# Patient Record
Sex: FEMALE | Race: WHITE | NOT HISPANIC OR LATINO | Employment: FULL TIME | URBAN - METROPOLITAN AREA
[De-identification: names, ages, dates, MRNs, and addresses within clinical notes are randomized per-mention and may not be internally consistent; named-entity substitution may affect disease eponyms.]

---

## 2023-05-16 ENCOUNTER — OFFICE VISIT (OUTPATIENT)
Dept: PRIMARY CARE | Facility: CLINIC | Age: 23
End: 2023-05-16
Payer: COMMERCIAL

## 2023-05-16 VITALS
SYSTOLIC BLOOD PRESSURE: 106 MMHG | DIASTOLIC BLOOD PRESSURE: 68 MMHG | TEMPERATURE: 98.2 F | HEIGHT: 59 IN | WEIGHT: 108 LBS | BODY MASS INDEX: 21.77 KG/M2

## 2023-05-16 DIAGNOSIS — J32.9 SINUSITIS, UNSPECIFIED CHRONICITY, UNSPECIFIED LOCATION: Primary | ICD-10-CM

## 2023-05-16 DIAGNOSIS — S03.00XA DISLOCATION OF TEMPOROMANDIBULAR JOINT, INITIAL ENCOUNTER: ICD-10-CM

## 2023-05-16 PROCEDURE — 1036F TOBACCO NON-USER: CPT | Performed by: NURSE PRACTITIONER

## 2023-05-16 PROCEDURE — 99214 OFFICE O/P EST MOD 30 MIN: CPT | Performed by: NURSE PRACTITIONER

## 2023-05-16 RX ORDER — SULFACETAMIDE SODIUM, SULFUR 100; 50 MG/G; MG/G
EMULSION TOPICAL
COMMUNITY
Start: 2023-05-11

## 2023-05-16 RX ORDER — KETOCONAZOLE 20 MG/G
CREAM TOPICAL
COMMUNITY
Start: 2023-05-15

## 2023-05-16 RX ORDER — AZITHROMYCIN 250 MG/1
TABLET, FILM COATED ORAL
Qty: 6 TABLET | Refills: 0 | Status: SHIPPED | OUTPATIENT
Start: 2023-05-16 | End: 2023-05-21

## 2023-05-16 RX ORDER — BENZOYL PEROXIDE 50 MG/ML
LIQUID TOPICAL
COMMUNITY
Start: 2023-01-31

## 2023-05-16 RX ORDER — SERTRALINE HYDROCHLORIDE 50 MG/1
75 TABLET, FILM COATED ORAL DAILY
COMMUNITY
Start: 2023-04-18 | End: 2023-07-25 | Stop reason: SDUPTHER

## 2023-05-16 RX ORDER — FLUTICASONE PROPIONATE 50 MCG
1 SPRAY, SUSPENSION (ML) NASAL
COMMUNITY
Start: 2015-10-01

## 2023-05-16 NOTE — PROGRESS NOTES
"Subjective   Patient ID: Melly Ortiz is a 22 y.o. female who presents for Sinus Problem (Sinus headache, lightheaded x 5 days).    HPI   Headache for the last 5 days.  Feels like pressure.  Thought she was dehydrated  No hx of allergies.  Cold symptoms last weeka nd then felt better now this.    Felt like she was 'falling'  had wakened from sleep.  Has been feeling 'off'  Works in ER      Review of Systems   All other systems reviewed and are negative.      Objective   /68 (BP Location: Left arm, Patient Position: Sitting)   Temp 36.8 °C (98.2 °F) (Temporal)   Ht 1.499 m (4' 11\")   Wt 49 kg (108 lb)   BMI 21.81 kg/m²     Physical Exam  Vitals and nursing note reviewed.   Constitutional:       Appearance: Normal appearance.   HENT:      Head: Normocephalic and atraumatic.      Right Ear: Ear canal and external ear normal.      Left Ear: Ear canal and external ear normal.      Ears:      Comments: Fluid/air line CHRISTIANE     Nose: Congestion present.      Mouth/Throat:      Comments: PND  Neck:      Thyroid: No thyroid mass, thyromegaly or thyroid tenderness.   Cardiovascular:      Rate and Rhythm: Normal rate and regular rhythm.      Heart sounds: Normal heart sounds.   Pulmonary:      Effort: Pulmonary effort is normal.      Breath sounds: Normal breath sounds.   Musculoskeletal:         General: Tenderness present.      Cervical back: Normal range of motion and neck supple.      Comments: + crepitus at TMJ CHRISTIANE   Lymphadenopathy:      Cervical: No cervical adenopathy.   Skin:     General: Skin is warm.   Neurological:      Mental Status: She is alert and oriented to person, place, and time.   Psychiatric:         Mood and Affect: Mood normal.         Behavior: Behavior normal.         Assessment/Plan   Problem List Items Addressed This Visit          Musculoskeletal    Dislocation of jaw       Infectious/Inflammatory    Sinusitis - Primary     Azithromycin 2 pills together today, then one each of the next " four days.    Flonase 2 sprays each nostril once daily.  Talk with Dentist about TMJ

## 2023-05-16 NOTE — PATIENT INSTRUCTIONS
Nice to meet you today.  Start Azithromycin 2 pills together today and then one each of the next four days.  TAKE AFTER EATING  Use Flonase 2 sprays each nostril once daily.  Talk with the Dentist about your TMJ - you can use Ibuprofen as needed for the discomfort.  Let me know if not improving.

## 2023-05-30 ENCOUNTER — OFFICE VISIT (OUTPATIENT)
Dept: PRIMARY CARE | Facility: CLINIC | Age: 23
End: 2023-05-30
Payer: COMMERCIAL

## 2023-05-30 VITALS
DIASTOLIC BLOOD PRESSURE: 62 MMHG | TEMPERATURE: 98.2 F | WEIGHT: 111 LBS | BODY MASS INDEX: 22.42 KG/M2 | SYSTOLIC BLOOD PRESSURE: 116 MMHG

## 2023-05-30 DIAGNOSIS — L03.031 CELLULITIS OF GREAT TOE OF RIGHT FOOT: Primary | ICD-10-CM

## 2023-05-30 PROCEDURE — 99213 OFFICE O/P EST LOW 20 MIN: CPT | Performed by: NURSE PRACTITIONER

## 2023-05-30 PROCEDURE — 1036F TOBACCO NON-USER: CPT | Performed by: NURSE PRACTITIONER

## 2023-05-30 RX ORDER — MUPIROCIN 20 MG/G
OINTMENT TOPICAL 3 TIMES DAILY
Qty: 22 G | Refills: 0 | Status: SHIPPED | OUTPATIENT
Start: 2023-05-30 | End: 2023-06-09

## 2023-05-30 RX ORDER — CEPHALEXIN 500 MG/1
500 CAPSULE ORAL 2 TIMES DAILY
Qty: 14 CAPSULE | Refills: 0 | Status: SHIPPED | OUTPATIENT
Start: 2023-05-30 | End: 2023-06-06

## 2023-05-30 ASSESSMENT — ENCOUNTER SYMPTOMS
CONSTITUTIONAL NEGATIVE: 1
ROS SKIN COMMENTS: AS NOTED IN HPI

## 2023-05-30 NOTE — PROGRESS NOTES
Subjective   Patient ID: Melly Ortiz is a 22 y.o. female who presents for skin complaint (Right great toe is red and painful).    HPI right big toe at the base of the nail was slightly red around her nail for a week or so. 2 nights ago woken up with pain.   No fever or chills  Rash as infant with penicillin    Review of Systems   Constitutional: Negative.    Skin:         As noted in HPI         Objective   /62 (BP Location: Left arm, Patient Position: Sitting)   Temp 36.8 °C (98.2 °F) (Temporal)   Wt 50.3 kg (111 lb)   BMI 22.42 kg/m²     Physical Exam  Constitutional:       Appearance: Normal appearance.   Cardiovascular:      Rate and Rhythm: Normal rate and regular rhythm.   Pulmonary:      Effort: Pulmonary effort is normal.      Breath sounds: Normal breath sounds.   Musculoskeletal:         General: Normal range of motion.   Skin:     Comments: Tender to touch erythematous area base of right big toenail.  No exudate.  Erythema extends into skin about 10 mm and at the side.    Neurological:      General: No focal deficit present.      Mental Status: She is alert.   Psychiatric:         Mood and Affect: Mood normal.         Behavior: Behavior normal.         Assessment/Plan   Problem List Items Addressed This Visit    None  Visit Diagnoses       Cellulitis of great toe of right foot    -  Primary    Relevant Medications    mupirocin (Bactroban) 2 % ointment    cephalexin (Keflex) 500 mg capsule

## 2023-05-30 NOTE — PATIENT INSTRUCTIONS
Please take the antibiotic as directed.   Use the ointment 3 times a day and keep covered.   Let me know in 2-3 days if no better.   Please take probiotics while on the antibiotic.   Stop the antibiotic if rash develops and let me know

## 2023-06-01 PROBLEM — J32.9 SINUSITIS: Status: ACTIVE | Noted: 2023-06-01

## 2023-06-01 PROBLEM — S03.00XA DISLOCATION OF JAW: Status: ACTIVE | Noted: 2023-06-01

## 2023-06-22 ENCOUNTER — OFFICE VISIT (OUTPATIENT)
Dept: PRIMARY CARE | Facility: CLINIC | Age: 23
End: 2023-06-22
Payer: COMMERCIAL

## 2023-06-22 VITALS
TEMPERATURE: 98.2 F | SYSTOLIC BLOOD PRESSURE: 110 MMHG | WEIGHT: 112.8 LBS | DIASTOLIC BLOOD PRESSURE: 70 MMHG | BODY MASS INDEX: 22.78 KG/M2

## 2023-06-22 DIAGNOSIS — E23.0 GHD (GROWTH HORMONE DEFICIENCY) (MULTI): ICD-10-CM

## 2023-06-22 DIAGNOSIS — F41.9 ANXIETY: Primary | ICD-10-CM

## 2023-06-22 PROCEDURE — 1036F TOBACCO NON-USER: CPT | Performed by: INTERNAL MEDICINE

## 2023-06-22 PROCEDURE — 99213 OFFICE O/P EST LOW 20 MIN: CPT | Performed by: INTERNAL MEDICINE

## 2023-06-22 ASSESSMENT — PATIENT HEALTH QUESTIONNAIRE - PHQ9
SUM OF ALL RESPONSES TO PHQ9 QUESTIONS 1 AND 2: 0
1. LITTLE INTEREST OR PLEASURE IN DOING THINGS: NOT AT ALL
2. FEELING DOWN, DEPRESSED OR HOPELESS: NOT AT ALL

## 2023-06-22 NOTE — PROGRESS NOTES
Subjective   Patient ID: Melly Ortiz is a 22 y.o. female who presents for Med Refill.    HPI   Overall well   Note 1/30/23 revewed  #1 anxiety-  overall much better.  No issues w/ rx.   Mood good.  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      Review of Systems   All other systems reviewed and are negative.      Objective   /70   Temp 36.8 °C (98.2 °F)   Wt 51.2 kg (112 lb 12.8 oz)   BMI 22.78 kg/m²     Physical Exam    Assessment/Plan     #1 anxiety- reviewed. Overall doing well. Recommend continued work with psychologist.  con't sertraline, to 75 mg.  Discussed plans in case this happens. We will follow closely.    #2 GH def-status post treatment

## 2023-07-25 ENCOUNTER — OFFICE VISIT (OUTPATIENT)
Dept: PRIMARY CARE | Facility: CLINIC | Age: 23
End: 2023-07-25
Payer: COMMERCIAL

## 2023-07-25 VITALS
TEMPERATURE: 97.7 F | SYSTOLIC BLOOD PRESSURE: 102 MMHG | WEIGHT: 111 LBS | BODY MASS INDEX: 22.42 KG/M2 | DIASTOLIC BLOOD PRESSURE: 70 MMHG

## 2023-07-25 DIAGNOSIS — F41.9 ANXIETY: ICD-10-CM

## 2023-07-25 DIAGNOSIS — R51.9 ACUTE NONINTRACTABLE HEADACHE, UNSPECIFIED HEADACHE TYPE: Primary | ICD-10-CM

## 2023-07-25 PROCEDURE — 99213 OFFICE O/P EST LOW 20 MIN: CPT | Performed by: INTERNAL MEDICINE

## 2023-07-25 PROCEDURE — 1036F TOBACCO NON-USER: CPT | Performed by: INTERNAL MEDICINE

## 2023-07-25 RX ORDER — SERTRALINE HYDROCHLORIDE 50 MG/1
75 TABLET, FILM COATED ORAL DAILY
Qty: 45 TABLET | Refills: 3 | Status: SHIPPED | OUTPATIENT
Start: 2023-07-25 | End: 2024-02-06 | Stop reason: SDUPTHER

## 2023-07-25 RX ORDER — MELOXICAM 7.5 MG/1
7.5 TABLET ORAL DAILY
Qty: 30 TABLET | Refills: 11 | Status: SHIPPED | OUTPATIENT
Start: 2023-07-25 | End: 2024-07-24

## 2023-07-25 NOTE — PROGRESS NOTES
"Subjective   Patient ID: Melly Ortiz is a 22 y.o. female who presents for Headache (X 3 weeks).    HPI   Constant.  Wax/wanes.  Typically better w/ ibuprofen (rare use).  \"All over\", \"moves around\"  Dull, ache.  Sometimes sharp/pressure.  Non- positional  Slowly improving.  No associated s/sx    No h/o HA's.  Brother w/ migraines    No trauma.   Initially w/ vertigo (constant), resolved over 4-5 days, non x >2 weeks  Review of Systems    Objective   /70   Temp 36.5 °C (97.7 °F)   Wt 50.3 kg (111 lb)   BMI 22.42 kg/m²     Physical Exam  Constitutional:       General: She is not in acute distress.     Appearance: Normal appearance. She is not ill-appearing or toxic-appearing.   HENT:      Head: Normocephalic and atraumatic.      Right Ear: Tympanic membrane and ear canal normal.      Left Ear: Tympanic membrane and ear canal normal.      Nose: Nose normal.      Mouth/Throat:      Pharynx: Oropharynx is clear.   Eyes:      Extraocular Movements: Extraocular movements intact.      Conjunctiva/sclera: Conjunctivae normal.      Pupils: Pupils are equal, round, and reactive to light.      Comments: Fundi benign with sharp optic disc bilaterally   Neurological:      General: No focal deficit present.      Mental Status: She is alert. Mental status is at baseline.      Cranial Nerves: No cranial nerve deficit.      Gait: Gait normal.      Deep Tendon Reflexes: Reflexes normal.   Psychiatric:         Mood and Affect: Mood normal.         Behavior: Behavior normal.         Assessment/Plan     Headache-slowly resolving.  Normal exam.  Nearly resolved at this point.  I suspect viral.  We will try a very short course of meloxicam.  Reviewed use risks and side effects.  Asked patient to follow-up 3 to 5 days if headache is not completely resolved.     "

## 2023-07-26 NOTE — PATIENT INSTRUCTIONS
Please try the meloxicam as we discussed.  Notify me immediately if your headache increases in intensity.  Let me know in 3 to 5 days if your headache is not resolved completely

## 2023-10-31 ENCOUNTER — APPOINTMENT (OUTPATIENT)
Dept: PRIMARY CARE | Facility: CLINIC | Age: 23
End: 2023-10-31
Payer: COMMERCIAL

## 2024-01-02 ENCOUNTER — APPOINTMENT (OUTPATIENT)
Dept: PRIMARY CARE | Facility: CLINIC | Age: 24
End: 2024-01-02
Payer: COMMERCIAL

## 2024-01-12 ENCOUNTER — APPOINTMENT (OUTPATIENT)
Dept: PRIMARY CARE | Facility: CLINIC | Age: 24
End: 2024-01-12
Payer: COMMERCIAL

## 2024-02-06 ENCOUNTER — OFFICE VISIT (OUTPATIENT)
Dept: PRIMARY CARE | Facility: CLINIC | Age: 24
End: 2024-02-06
Payer: COMMERCIAL

## 2024-02-06 VITALS
BODY MASS INDEX: 22.02 KG/M2 | DIASTOLIC BLOOD PRESSURE: 62 MMHG | SYSTOLIC BLOOD PRESSURE: 102 MMHG | WEIGHT: 109 LBS | TEMPERATURE: 97.8 F

## 2024-02-06 DIAGNOSIS — F41.9 ANXIETY: ICD-10-CM

## 2024-02-06 PROCEDURE — 1036F TOBACCO NON-USER: CPT | Performed by: INTERNAL MEDICINE

## 2024-02-06 PROCEDURE — 99213 OFFICE O/P EST LOW 20 MIN: CPT | Performed by: INTERNAL MEDICINE

## 2024-02-06 RX ORDER — SERTRALINE HYDROCHLORIDE 50 MG/1
75 TABLET, FILM COATED ORAL DAILY
Qty: 135 TABLET | Refills: 3 | Status: SHIPPED | OUTPATIENT
Start: 2024-02-06 | End: 2025-02-05

## 2024-02-06 NOTE — PROGRESS NOTES
Subjective   Patient ID: Melly Ortiz is a 23 y.o. female who presents for Med Refill.    Med Refill       Overall well   Note 7/25/23 revewed  #1 anxiety-    No issues w/ rx.   Mood good.  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  Headaches have resolved.  #2 GH def-remote treatment.      Review of Systems   All other systems reviewed and are negative.      Objective   /62   Temp 36.6 °C (97.8 °F)   Wt 49.4 kg (109 lb)   BMI 22.02 kg/m²     Physical Exam  Constitutional:       General: She is not in acute distress.     Appearance: Normal appearance. She is not ill-appearing or toxic-appearing.   HENT:      Head: Normocephalic and atraumatic.      Right Ear: Tympanic membrane and ear canal normal.      Left Ear: Tympanic membrane and ear canal normal.      Nose: Nose normal.      Mouth/Throat:      Pharynx: Oropharynx is clear.   Eyes:      Extraocular Movements: Extraocular movements intact.      Conjunctiva/sclera: Conjunctivae normal.      Pupils: Pupils are equal, round, and reactive to light.      Comments: Fundi benign with sharp optic disc bilaterally   Neurological:      General: No focal deficit present.      Mental Status: She is alert. Mental status is at baseline.      Cranial Nerves: No cranial nerve deficit.      Gait: Gait normal.      Deep Tendon Reflexes: Reflexes normal.   Psychiatric:         Mood and Affect: Mood normal.         Behavior: Behavior normal.         Assessment/Plan     #1 anxiety- reviewed. Overall doing well. Recommend continued work with psychologist.  con't sertraline, to 75 mg.  Discussed plans in case this happens. We will follow closely.    #2 GH def-status post treatment

## 2024-07-22 ENCOUNTER — OFFICE VISIT (OUTPATIENT)
Dept: PRIMARY CARE | Facility: CLINIC | Age: 24
End: 2024-07-22
Payer: COMMERCIAL

## 2024-07-22 VITALS
SYSTOLIC BLOOD PRESSURE: 110 MMHG | WEIGHT: 110.4 LBS | TEMPERATURE: 98.2 F | OXYGEN SATURATION: 99 % | HEART RATE: 88 BPM | DIASTOLIC BLOOD PRESSURE: 70 MMHG | BODY MASS INDEX: 22.3 KG/M2

## 2024-07-22 DIAGNOSIS — R21 RASH: Primary | ICD-10-CM

## 2024-07-22 PROCEDURE — 99213 OFFICE O/P EST LOW 20 MIN: CPT | Performed by: STUDENT IN AN ORGANIZED HEALTH CARE EDUCATION/TRAINING PROGRAM

## 2024-07-22 RX ORDER — PREDNISONE 10 MG/1
TABLET ORAL
Qty: 30 TABLET | Refills: 0 | Status: SHIPPED | OUTPATIENT
Start: 2024-07-22 | End: 2024-08-03

## 2024-07-22 RX ORDER — TRIAMCINOLONE ACETONIDE 1 MG/G
OINTMENT TOPICAL 2 TIMES DAILY PRN
Qty: 60 G | Refills: 0 | Status: SHIPPED | OUTPATIENT
Start: 2024-07-22 | End: 2024-11-19

## 2024-07-22 ASSESSMENT — ENCOUNTER SYMPTOMS
OCCASIONAL FEELINGS OF UNSTEADINESS: 0
DEPRESSION: 0

## 2024-07-22 ASSESSMENT — PATIENT HEALTH QUESTIONNAIRE - PHQ9
1. LITTLE INTEREST OR PLEASURE IN DOING THINGS: NOT AT ALL
SUM OF ALL RESPONSES TO PHQ9 QUESTIONS 1 AND 2: 0
2. FEELING DOWN, DEPRESSED OR HOPELESS: NOT AT ALL

## 2024-07-22 NOTE — PROGRESS NOTES
Subjective   Patient ID: Melly Ortiz is a 23 y.o. female who presents for Rash (X 3 days, per pt all over, itching at times/).    HPI   Patient here here for rash that started on abdomen. Then to bilateral forearms to shoulder, back of hand, bilateral legs.   It is sometimes itchy.  No new lotions, soaps, detergents. Hasn't been in the woods or gardening. Hasn't been outside recently, working. Never had this before    No new foods or medications.    Has tried benadryl twice and it didn't help. No daily antihistamine. Never had eczema. Has had ringworm in the past.     Review of Systems   Constitutional:  Negative for chills, fatigue and fever.   Respiratory:  Negative for cough, shortness of breath and wheezing.    Cardiovascular:  Negative for chest pain, palpitations and leg swelling.   Gastrointestinal:  Negative for abdominal pain, constipation, diarrhea and nausea.   Genitourinary:  Negative for dysuria, frequency, hematuria and urgency.   Neurological:  Negative for dizziness, numbness and headaches.   Psychiatric/Behavioral:  Negative for dysphoric mood. The patient is not nervous/anxious.        Objective   /70 (BP Location: Right arm, Patient Position: Sitting, BP Cuff Size: Adult)   Pulse 88   Temp 36.8 °C (98.2 °F) (Temporal)   Wt 50.1 kg (110 lb 6.4 oz)   SpO2 99%   BMI 22.30 kg/m²     Physical Exam  Constitutional:       Appearance: Normal appearance.   HENT:      Head: Normocephalic and atraumatic.   Eyes:      Extraocular Movements: Extraocular movements intact.      Pupils: Pupils are equal, round, and reactive to light.   Cardiovascular:      Rate and Rhythm: Normal rate and regular rhythm.      Heart sounds: Normal heart sounds. No murmur heard.  Pulmonary:      Effort: Pulmonary effort is normal.      Breath sounds: Normal breath sounds. No wheezing.   Abdominal:      General: Bowel sounds are normal.      Palpations: Abdomen is soft.      Tenderness: There is no abdominal tenderness.  There is no guarding.   Musculoskeletal:         General: Normal range of motion.   Skin:     General: Skin is warm and dry.      Comments: Erythematous, maculopapular rash bilateral arms, abdomen, legs   Neurological:      General: No focal deficit present.      Mental Status: She is alert and oriented to person, place, and time.   Psychiatric:         Mood and Affect: Mood normal.         Behavior: Behavior normal.         Assessment/Plan   Problem List Items Addressed This Visit    None  Visit Diagnoses       Rash    -  Primary    Relevant Medications    predniSONE (Deltasone) 10 mg tablet    triamcinolone (Kenalog) 0.1 % ointment          Unsure cause of her rash, will start prednisone taper as well as as needed triamcinolone.  She is going to let me know if she is not improving       Patient understands and agrees with treatment plan    María Vora, DO   07/29/24

## 2024-07-29 ASSESSMENT — ENCOUNTER SYMPTOMS
ABDOMINAL PAIN: 0
CHILLS: 0
DYSPHORIC MOOD: 0
COUGH: 0
DYSURIA: 0
NAUSEA: 0
SHORTNESS OF BREATH: 0
FATIGUE: 0
FREQUENCY: 0
NERVOUS/ANXIOUS: 0
DIZZINESS: 0
HEMATURIA: 0
NUMBNESS: 0
PALPITATIONS: 0
CONSTIPATION: 0
HEADACHES: 0
WHEEZING: 0
FEVER: 0
DIARRHEA: 0

## 2024-08-21 ENCOUNTER — OFFICE VISIT (OUTPATIENT)
Dept: PRIMARY CARE | Facility: CLINIC | Age: 24
End: 2024-08-21
Payer: COMMERCIAL

## 2024-08-21 VITALS
BODY MASS INDEX: 21.77 KG/M2 | WEIGHT: 108 LBS | HEIGHT: 59 IN | SYSTOLIC BLOOD PRESSURE: 102 MMHG | DIASTOLIC BLOOD PRESSURE: 80 MMHG | TEMPERATURE: 97.6 F

## 2024-08-21 DIAGNOSIS — T14.8XXA SKIN ABRASION: Primary | ICD-10-CM

## 2024-08-21 PROCEDURE — 3008F BODY MASS INDEX DOCD: CPT | Performed by: NURSE PRACTITIONER

## 2024-08-21 PROCEDURE — 1036F TOBACCO NON-USER: CPT | Performed by: NURSE PRACTITIONER

## 2024-08-21 PROCEDURE — 99213 OFFICE O/P EST LOW 20 MIN: CPT | Performed by: NURSE PRACTITIONER

## 2024-08-21 ASSESSMENT — ENCOUNTER SYMPTOMS
ROS SKIN COMMENTS: AS NOTED IN HPI
CONSTITUTIONAL NEGATIVE: 1

## 2024-08-21 NOTE — PROGRESS NOTES
"Subjective   Patient ID: Melly Ortiz is a 23 y.o. female who presents for Mass (Lump under right armpit about the size of a marble).    HPI Shaves every night in the shower.   8/17 noticed in the shower that she had a sore area under her right armpit, did hurt. Hurt into next day, not hurting anymore.  Still feels like it is slightly raised.    Review of Systems   Constitutional: Negative.    Skin:         As noted in HPI         Objective   /80   Temp 36.4 °C (97.6 °F)   Ht 1.499 m (4' 11\")   Wt 49 kg (108 lb)   BMI 21.81 kg/m²     Physical Exam  Constitutional:       Appearance: Normal appearance.   Pulmonary:      Effort: Pulmonary effort is normal.   Musculoskeletal:         General: Normal range of motion.   Skin:     General: Skin is warm.      Comments: Light abrasion in right axilla, non tender   Neurological:      General: No focal deficit present.      Mental Status: She is alert.   Psychiatric:         Mood and Affect: Mood normal.         Behavior: Behavior normal.         Assessment/Plan          "

## 2025-06-25 ENCOUNTER — OFFICE VISIT (OUTPATIENT)
Dept: PRIMARY CARE | Facility: CLINIC | Age: 25
End: 2025-06-25
Payer: COMMERCIAL

## 2025-06-25 VITALS
OXYGEN SATURATION: 98 % | SYSTOLIC BLOOD PRESSURE: 100 MMHG | TEMPERATURE: 97.9 F | BODY MASS INDEX: 21.57 KG/M2 | DIASTOLIC BLOOD PRESSURE: 68 MMHG | WEIGHT: 106.8 LBS | HEART RATE: 68 BPM

## 2025-06-25 DIAGNOSIS — M99.08 RIB CAGE DYSFUNCTION: Primary | ICD-10-CM

## 2025-06-25 DIAGNOSIS — J45.909 UNCOMPLICATED ASTHMA, UNSPECIFIED ASTHMA SEVERITY, UNSPECIFIED WHETHER PERSISTENT (HHS-HCC): ICD-10-CM

## 2025-06-25 PROCEDURE — 98925 OSTEOPATH MANJ 1-2 REGIONS: CPT | Performed by: FAMILY MEDICINE

## 2025-06-25 PROCEDURE — 1036F TOBACCO NON-USER: CPT | Performed by: FAMILY MEDICINE

## 2025-06-25 PROCEDURE — 99214 OFFICE O/P EST MOD 30 MIN: CPT | Performed by: FAMILY MEDICINE

## 2025-06-25 RX ORDER — MELOXICAM 15 MG/1
15 TABLET ORAL DAILY
Qty: 14 TABLET | Refills: 0 | Status: SHIPPED | OUTPATIENT
Start: 2025-06-25 | End: 2026-06-25

## 2025-06-25 ASSESSMENT — ENCOUNTER SYMPTOMS
OCCASIONAL FEELINGS OF UNSTEADINESS: 0
LOSS OF SENSATION IN FEET: 0
DEPRESSION: 0

## 2025-06-25 NOTE — PROGRESS NOTES
Assessment/Plan     Muscle energy on rib 8/9 on right side due to somatic dysfunction of the ribafter injury  Discussed continued exercises to help improve pain   Meloxicam x 2 weeks to help w/ inflammatino.     Problem List Items Addressed This Visit       Asthma    Overview   Formatting of this note might be different from the original. Inactive code replaced with active code This term is a replacement for an inactive term         Relevant Medications    albuterol 90 mcg/actuation aerosol powdr breath activated inhaler     Other Visit Diagnoses         Rib cage dysfunction    -  Primary    Relevant Medications    meloxicam (Mobic) 15 mg tablet                  Subjective   Patient ID: Melly Ortiz is a 24 y.o. female who presents for Right Side Rib Pain (Hurting for 2 weeks).    Injured about 2 weeks ago   Pain at attachment to sternum   For a couple days     Deep braeth it is painful         Objective   /68 (BP Location: Left arm, Patient Position: Sitting, BP Cuff Size: Adult)   Pulse 68   Temp 36.6 °C (97.9 °F) (Skin)   Wt 48.4 kg (106 lb 12.8 oz)   SpO2 98%   BMI 21.57 kg/m²     Physical Exam:     Constitutional:   General: not in acute distress  Appearance: normal appearance, non-toxic, not ill-appearing or diaphoretic.   HENT:   Head: Normocephalic and atraumatic  Eyes: EOMI, PERJENNIFER Ash DO     I spent a total of 12 minutes on the date of the service which included preparing to see the patient, face-to-face patient care, completing clinical documentation, performing a medically appropriate examination, counseling and educating the patient/family/caregiver and ordering medications, tests, or procedures.